# Patient Record
Sex: MALE | Race: BLACK OR AFRICAN AMERICAN | NOT HISPANIC OR LATINO | Employment: FULL TIME | URBAN - METROPOLITAN AREA
[De-identification: names, ages, dates, MRNs, and addresses within clinical notes are randomized per-mention and may not be internally consistent; named-entity substitution may affect disease eponyms.]

---

## 2017-06-19 ENCOUNTER — HOSPITAL ENCOUNTER (EMERGENCY)
Facility: HOSPITAL | Age: 27
Discharge: HOME | End: 2017-06-19
Attending: EMERGENCY MEDICINE

## 2017-06-19 VITALS
RESPIRATION RATE: 19 BRPM | DIASTOLIC BLOOD PRESSURE: 89 MMHG | HEIGHT: 71 IN | WEIGHT: 290 LBS | OXYGEN SATURATION: 98 % | BODY MASS INDEX: 40.6 KG/M2 | SYSTOLIC BLOOD PRESSURE: 160 MMHG | HEART RATE: 92 BPM | TEMPERATURE: 98 F

## 2017-06-19 DIAGNOSIS — J06.9 URI, ACUTE: Primary | ICD-10-CM

## 2017-06-19 DIAGNOSIS — J20.9 ACUTE BRONCHITIS, UNSPECIFIED ORGANISM: ICD-10-CM

## 2017-06-19 DIAGNOSIS — R03.0 ELEVATED BLOOD PRESSURE READING: ICD-10-CM

## 2017-06-19 PROCEDURE — 99283 EMERGENCY DEPT VISIT LOW MDM: CPT

## 2017-06-19 RX ORDER — ALBUTEROL SULFATE 90 UG/1
1-2 AEROSOL, METERED RESPIRATORY (INHALATION) EVERY 6 HOURS PRN
Qty: 1 INHALER | Refills: 0 | Status: SHIPPED | OUTPATIENT
Start: 2017-06-19 | End: 2019-03-09

## 2017-06-19 RX ORDER — BENZONATATE 100 MG/1
100 CAPSULE ORAL 3 TIMES DAILY PRN
Qty: 20 CAPSULE | Refills: 0 | Status: SHIPPED | OUTPATIENT
Start: 2017-06-19 | End: 2017-06-29

## 2017-06-19 RX ORDER — PREDNISONE 20 MG/1
40 TABLET ORAL DAILY
Qty: 10 TABLET | Refills: 0 | Status: SHIPPED | OUTPATIENT
Start: 2017-06-19 | End: 2017-06-24

## 2017-06-19 RX ORDER — CETIRIZINE HYDROCHLORIDE 10 MG/1
10 TABLET ORAL DAILY
Qty: 30 TABLET | Refills: 0 | Status: SHIPPED | OUTPATIENT
Start: 2017-06-19 | End: 2019-03-09

## 2017-06-19 NOTE — ED PROVIDER NOTES
Encounter Date: 6/19/2017       History     Chief Complaint   Patient presents with    URI     since friday      Review of patient's allergies indicates:  No Known Allergies  The history is provided by the patient.   URI   The primary symptoms include cough. Primary symptoms do not include fever, fatigue, headaches, ear pain, sore throat, wheezing, abdominal pain, nausea, vomiting, myalgias, arthralgias or rash. Illness onset: 4 days. This is a new problem. The problem has been gradually worsening.   The cough began yesterday. The cough is non-productive.   Symptoms associated with the illness include congestion and rhinorrhea. The illness is not associated with chills, plugged ear sensation, facial pain or sinus pressure.     Took OTC theraflu c min help.  No PCP so he came to ED for eval.  Past Medical History:   Diagnosis Date    Hypertension      History reviewed. No pertinent surgical history.  Family History   Problem Relation Age of Onset    Stroke Mother     No Known Problems Father      Social History   Substance Use Topics    Smoking status: Never Smoker    Smokeless tobacco: Not on file    Alcohol use No     Review of Systems   Constitutional: Negative for chills, fatigue and fever.   HENT: Positive for congestion and rhinorrhea. Negative for ear discharge, ear pain, facial swelling, sinus pressure and sore throat.    Respiratory: Positive for cough. Negative for chest tightness, shortness of breath and wheezing.    Gastrointestinal: Negative for abdominal pain, nausea and vomiting.   Musculoskeletal: Negative for arthralgias and myalgias.   Skin: Negative for rash.   Neurological: Negative for headaches.   All other systems reviewed and are negative.      Physical Exam     Initial Vitals [06/19/17 0955]   BP Pulse Resp Temp SpO2   (!) 162/107 105 20 98 °F (36.7 °C) 98 %     Vitals:    06/19/17 0955 06/19/17 1017   BP: (!) 162/107 (!) 160/89   Pulse: 105 92   Resp: 20 19   Temp: 98 °F (36.7 °C)   "  TempSrc: Oral    SpO2: 98% 98%   Weight: 131.5 kg (290 lb)    Height: 5' 11" (1.803 m)      Physical Exam    Nursing note and vitals reviewed.  Constitutional: He appears well-developed and well-nourished.   obese   HENT:   Head: Normocephalic and atraumatic.   Mouth/Throat: Oropharynx is clear and moist.   Eyes: EOM are normal. Pupils are equal, round, and reactive to light.   Neck: Normal range of motion. Neck supple.   Cardiovascular: Normal rate, regular rhythm, normal heart sounds and intact distal pulses.   No edema   Pulmonary/Chest: Breath sounds normal. No respiratory distress. He has no wheezes. He has no rhonchi.   Abdominal: Soft. Bowel sounds are normal. There is no rebound.   Musculoskeletal: Normal range of motion. He exhibits no edema.   Neurological: He is alert and oriented to person, place, and time. He has normal strength. No cranial nerve deficit or sensory deficit.   Skin: Skin is warm and dry. No rash noted.   Psychiatric: He has a normal mood and affect. His behavior is normal. Judgment and thought content normal.         ED Course   Procedures  Labs Reviewed - No data to display               No results found for this or any previous visit.               ED Course     Clinical Impression:   The primary encounter diagnosis was URI, acute. Diagnoses of Acute bronchitis, unspecified organism and Elevated blood pressure reading were also pertinent to this visit.    Disposition:   Disposition: Discharged  Condition: Stable       Matty Rajan MD  06/19/17 1523    "

## 2018-04-25 ENCOUNTER — CLINICAL SUPPORT (OUTPATIENT)
Dept: OCCUPATIONAL MEDICINE | Facility: CLINIC | Age: 28
End: 2018-04-25

## 2018-04-25 PROCEDURE — 99499 UNLISTED E&M SERVICE: CPT | Mod: S$GLB,,, | Performed by: PREVENTIVE MEDICINE

## 2018-04-25 PROCEDURE — 80305 DRUG TEST PRSMV DIR OPT OBS: CPT | Mod: S$GLB,,, | Performed by: PREVENTIVE MEDICINE

## 2018-04-25 PROCEDURE — 94010 BREATHING CAPACITY TEST: CPT | Mod: S$GLB,,, | Performed by: PREVENTIVE MEDICINE

## 2018-04-25 PROCEDURE — 99080 SPECIAL REPORTS OR FORMS: CPT | Mod: S$GLB,,, | Performed by: PREVENTIVE MEDICINE

## 2019-03-09 ENCOUNTER — HOSPITAL ENCOUNTER (EMERGENCY)
Facility: HOSPITAL | Age: 29
Discharge: HOME | End: 2019-03-09
Attending: EMERGENCY MEDICINE
Payer: COMMERCIAL

## 2019-03-09 VITALS
OXYGEN SATURATION: 100 % | TEMPERATURE: 99 F | HEIGHT: 70 IN | RESPIRATION RATE: 20 BRPM | BODY MASS INDEX: 45.1 KG/M2 | WEIGHT: 315 LBS | SYSTOLIC BLOOD PRESSURE: 134 MMHG | HEART RATE: 98 BPM | DIASTOLIC BLOOD PRESSURE: 84 MMHG

## 2019-03-09 DIAGNOSIS — B34.9 VIRAL ILLNESS: Primary | ICD-10-CM

## 2019-03-09 DIAGNOSIS — I10 ESSENTIAL HYPERTENSION: ICD-10-CM

## 2019-03-09 DIAGNOSIS — J00 ACUTE NASOPHARYNGITIS: ICD-10-CM

## 2019-03-09 PROBLEM — E66.01 MORBID OBESITY DUE TO EXCESS CALORIES: Status: ACTIVE | Noted: 2017-01-27

## 2019-03-09 LAB
INFLUENZA A, MOLECULAR: NEGATIVE
INFLUENZA B, MOLECULAR: NEGATIVE
SPECIMEN SOURCE: NORMAL

## 2019-03-09 PROCEDURE — 87502 INFLUENZA DNA AMP PROBE: CPT | Mod: ER

## 2019-03-09 PROCEDURE — 99284 EMERGENCY DEPT VISIT MOD MDM: CPT | Mod: ER

## 2019-03-09 PROCEDURE — 25000003 PHARM REV CODE 250: Mod: ER | Performed by: NURSE PRACTITIONER

## 2019-03-09 RX ORDER — LORATADINE 10 MG/1
10 TABLET ORAL DAILY
COMMUNITY
Start: 2019-03-09 | End: 2020-03-08

## 2019-03-09 RX ORDER — AMLODIPINE BESYLATE 10 MG/1
10 TABLET ORAL DAILY
Qty: 30 TABLET | Refills: 0 | Status: SHIPPED | OUTPATIENT
Start: 2019-03-09

## 2019-03-09 RX ORDER — ACETAMINOPHEN 500 MG
1000 TABLET ORAL
Status: COMPLETED | OUTPATIENT
Start: 2019-03-09 | End: 2019-03-09

## 2019-03-09 RX ORDER — CLONIDINE HYDROCHLORIDE 0.2 MG/1
0.2 TABLET ORAL
Status: COMPLETED | OUTPATIENT
Start: 2019-03-09 | End: 2019-03-09

## 2019-03-09 RX ORDER — IBUPROFEN 800 MG/1
800 TABLET ORAL EVERY 6 HOURS PRN
Qty: 30 TABLET | Refills: 0 | Status: SHIPPED | OUTPATIENT
Start: 2019-03-09

## 2019-03-09 RX ADMIN — CLONIDINE HYDROCHLORIDE 0.2 MG: 0.2 TABLET ORAL at 01:03

## 2019-03-09 RX ADMIN — ACETAMINOPHEN 1000 MG: 500 TABLET ORAL at 01:03

## 2019-03-09 NOTE — ED PROVIDER NOTES
"Encounter Date: 3/9/2019       History     Chief Complaint   Patient presents with    Nasal Congestion     since this am, no treatment PTA     The history is provided by the patient.   URI   The primary symptoms include fever, headaches, sore throat and myalgias. Primary symptoms do not include cough, abdominal pain, nausea, vomiting or rash. The current episode started today. This is a new problem. The problem has not changed since onset.The fever began today. The fever has been present for less than 1 day. The temperature was taken by no thermometer. The maximum temperature recorded prior to his arrival was unknown.   The headache began today. The pain from the headache is at a severity of 4/10. Location/region(s) of the headache: frontal. The headache is not associated with aura, photophobia, eye pain, visual change, neck stiffness, paresthesias or loss of balance.   The sore throat began today. The sore throat has been unchanged since its onset. The sore throat is not accompanied by trouble swallowing, drooling, hoarse voice or stridor. The sore throat pain is at a severity of 4/10.   Myalgias began today. The myalgias are generalized. The myalgias are aching. The myalgias are not associated with weakness.   The onset of the illness is associated with exposure to sick contacts (patient states that his children "may have had the flu" this past week). Symptoms associated with the illness include chills, sinus pressure, congestion and rhinorrhea. The following treatments were addressed: Acetaminophen was not tried. A decongestant was not tried. Aspirin was not tried. NSAIDs were not tried.         PCP:    Primary Doctor No        Review of patient's allergies indicates:  No Known Allergies  Past Medical History:   Diagnosis Date    Hypertension      History reviewed. No pertinent surgical history.  Family History   Problem Relation Age of Onset    Stroke Mother     No Known Problems Father      Social History "     Tobacco Use    Smoking status: Never Smoker    Smokeless tobacco: Never Used   Substance Use Topics    Alcohol use: No    Drug use: No     Review of Systems   Constitutional: Positive for chills and fever.   HENT: Positive for congestion, postnasal drip, rhinorrhea, sinus pressure, sneezing and sore throat. Negative for drooling, ear discharge, hoarse voice and trouble swallowing.    Eyes: Negative for photophobia and pain.   Respiratory: Negative for cough, chest tightness, shortness of breath and stridor.    Cardiovascular: Negative for chest pain and palpitations.   Gastrointestinal: Negative for abdominal pain, diarrhea, nausea and vomiting.   Genitourinary: Negative for difficulty urinating and dysuria.   Musculoskeletal: Positive for myalgias. Negative for back pain and neck stiffness.   Skin: Negative for color change and rash.   Neurological: Positive for headaches. Negative for dizziness, weakness, light-headedness, paresthesias and loss of balance.   Hematological: Does not bruise/bleed easily.   Psychiatric/Behavioral: Negative for confusion.       Physical Exam     Initial Vitals [03/09/19 1307]   BP Pulse Resp Temp SpO2   (!) 187/115 109 18 99.3 °F (37.4 °C) 100 %      MAP       --         Physical Exam    Nursing note and vitals reviewed.  Constitutional: He appears well-developed and well-nourished. He is Obese . He is cooperative. He does not appear ill. No distress.   HENT:   Head: Normocephalic and atraumatic.   Right Ear: Hearing, tympanic membrane, external ear and ear canal normal.   Left Ear: Hearing, tympanic membrane, external ear and ear canal normal.   Nose: Mucosal edema and sinus tenderness present.   Mouth/Throat: Uvula is midline and mucous membranes are normal. Posterior oropharyngeal erythema present.   Eyes: Conjunctivae, EOM and lids are normal. Pupils are equal, round, and reactive to light.   Neck: Trachea normal and normal range of motion. Neck supple.   Cardiovascular:  "Normal rate, regular rhythm, intact distal pulses and normal pulses.   Pulmonary/Chest: Effort normal and breath sounds normal. No accessory muscle usage. No respiratory distress. He has no decreased breath sounds. He has no wheezes. He has no rhonchi. He has no rales.   Abdominal: Soft. He exhibits no distension and no mass. There is no tenderness. There is no rebound and no guarding.   Musculoskeletal: Normal range of motion. He exhibits no edema.   Lymphadenopathy:     He has no cervical adenopathy.   Neurological: He is alert and oriented to person, place, and time. He has normal strength. No sensory deficit. Gait normal. GCS eye subscore is 4. GCS verbal subscore is 5. GCS motor subscore is 6.   Neurovascular intact to all extremities.    Skin: Skin is warm, dry and intact. Capillary refill takes less than 2 seconds. No rash noted.   Normal color and turgor.    Psychiatric: He has a normal mood and affect. His speech is normal and behavior is normal. Cognition and memory are normal.         ED Course   Procedures    ED Lab Results:   Results for orders placed or performed during the hospital encounter of 03/09/19   Influenza A & B by Molecular   Result Value Ref Range    Influenza A, Molecular Negative Negative    Influenza B, Molecular Negative Negative    Flu A & B Source NP            ED Medications:   Medications   cloNIDine tablet 0.2 mg (0.2 mg Oral Given 3/9/19 1333)   acetaminophen tablet 1,000 mg (1,000 mg Oral Given 3/9/19 1333)         ED Course Vitals  Vitals:    03/09/19 1307 03/09/19 1405 03/09/19 1435   BP: (!) 187/115 (!) 172/110 134/84   BP Location: Left arm Right arm Right arm   Patient Position: Sitting Sitting    Pulse: 109 100 98   Resp: 18 20 20   Temp: 99.3 °F (37.4 °C) 99.1 °F (37.3 °C)    TempSrc: Oral Oral    SpO2: 100% 100% 100%   Weight: (!) 146.9 kg (323 lb 13.7 oz)     Height: 5' 10" (1.778 m)           1435 HOURS RE-EVALUATION & DISPOSITION:   Reassessment at the time of " disposition demonstrates that the patient is resting comfortably in no acute distress.  He has remained hemodynamically stable throughout the entire ED visit and is without objective evidence for acute process requiring urgent intervention or hospitalization. I discussed test results and provided counseling to patient with regard to condition, the treatment plan, specific conditions for return, and the importance of follow up.  Answered questions at this time. The patient is stable for discharge.                  Additional MDM:   Hypertension: The patient has hypertensive urgency (systolic BP > 180 and/or diastolic BP > 110 with no end organ damage). Medications given: Catapres PO. Response: The patient's BP was controlled using oral medications. The patient's condition was felt to be stable.                    Clinical Impression:       ICD-10-CM ICD-9-CM   1. Viral illness B34.9 079.99   2. Essential hypertension I10 401.9   3. Acute nasopharyngitis J00 460           Disposition:   Disposition: Discharged  Condition: Stable  I discussed with patient that the evaluation in the emergency department does not suggest any emergent or life threatening medical condition requiring immediate intervention beyond what was provided in the ED, and I believe patient is safe for discharge.  Regardless, an unremarkable evaluation in the ED does not preclude the development or presence of a serious of life threatening condition. As such, patient was instructed to return immediately for any worsening or change in current symptoms. I also discussed the results of my evaluation and diagnostics with patient and he concurs with the evaluation and management plan.  Detailed written and verbal instructions provided to patient and he expressed a verbal understanding of the discharge instructions and overall management plan. Reiterated the importance of medication administration and safety and advised patient to follow up with primary  care provider in 3-5 days or sooner if needed.  Also advised patient to return to the ER for any complications.     Regarding HYPERTENSION, I advised patient to: keep a record of blood pressure results; take your blood pressure medication exactly as directed without skipping doses; avoid medications that contain heart stimulants, including over-the-counter drugs such as decongestants; maintain a healthy weight; cut back on sodium intake (i.e., limit canned, dried, packaged, and fast foods and dont add salt to food); follow the DASH (Dietary Approaches to Stop Hypertension) eating plan which recommends vegetables, fruits, whole gains, and other heart healthy foods; begin an exercise program that includes  aerobic exercise 3 to 4 times a week for an average of 40 minutes at a time (with approval of cardiologist or primary care provider); limit drinks that contain alcohol and caffeine; control levels of emotional stress; and seek emergency care for any shortness of breath, chest pain, difficulty speaking, confusion, or visual changes.      Regarding UPPER RESPIRATORY ILLNESS, for treatment, I encouraged patient to: drink plenty of fluids; get lots of rest; take medications as prescribed; use over-the-counter medications for symptomatic treatment;  and use a humidifier or steam in the bathroom to improve patency of upper airway.  Patient instructed to notify primary care provider, or return to the emergency department, if the they: have a cough most days or have a cough that returns frequently; begin coughing up blood; develop a high fever or shaking chills; have a low-grade fever for three or more days; develop thick, greenish mucus, especially if it has a bad smell; and experience shortness of breath or chest pain. For prevention, discussed with patient the importance of refraining from smoking (if applicable), getting annual influenza vaccines, reducing exposure to air pollution, and the need to frequently wash  hands to avoid spread of infection.            Follow-up Information     Schedule an appointment as soon as possible for a visit  with Primary Care Provider.    Why:  For further evaluation and treatment of your elevated blood pressure                   New Prescriptions    AMLODIPINE (NORVASC) 10 MG TABLET    Take 1 tablet (10 mg total) by mouth once daily.    IBUPROFEN (ADVIL,MOTRIN) 800 MG TABLET    Take 1 tablet (800 mg total) by mouth every 6 (six) hours as needed (Fever and/or Pain).    LORATADINE (CLARITIN) 10 MG TABLET    Take 1 tablet (10 mg total) by mouth once daily.                    Adama Yu, OMKAR  03/09/19 4745

## 2019-03-09 NOTE — DISCHARGE INSTRUCTIONS
The test for influenza was negative.     Do your history of hypertension, I recommend avoiding the use of decongestants.  Decongestants such as pseudoephedrine or phenylephrine cause your blood pressure to increase and should be avoided.    Follow up with your primary care provider AS SOON AS POSSIBLE for re-evaluation and further treatment of your blood pressure.    Your medications have been sent electronically to [facility] in [place]. Take your medications as prescribed.

## 2024-11-16 ENCOUNTER — HOSPITAL ENCOUNTER (EMERGENCY)
Facility: HOSPITAL | Age: 34
Discharge: HOME | End: 2024-11-16
Attending: EMERGENCY MEDICINE
Payer: COMMERCIAL

## 2024-11-16 VITALS
BODY MASS INDEX: 44.1 KG/M2 | TEMPERATURE: 99 F | HEIGHT: 70 IN | OXYGEN SATURATION: 95 % | HEART RATE: 96 BPM | RESPIRATION RATE: 18 BRPM | SYSTOLIC BLOOD PRESSURE: 151 MMHG | DIASTOLIC BLOOD PRESSURE: 83 MMHG | WEIGHT: 308.06 LBS

## 2024-11-16 DIAGNOSIS — F41.9 ANXIETY: ICD-10-CM

## 2024-11-16 DIAGNOSIS — R07.9 CHEST PAIN: Primary | ICD-10-CM

## 2024-11-16 DIAGNOSIS — I10 PRIMARY HYPERTENSION: ICD-10-CM

## 2024-11-16 LAB
ALBUMIN SERPL BCP-MCNC: 3.5 G/DL (ref 3.5–5.2)
ALP SERPL-CCNC: 48 U/L (ref 40–150)
ALT SERPL W/O P-5'-P-CCNC: 35 U/L (ref 10–44)
ANION GAP SERPL CALC-SCNC: 11 MMOL/L (ref 8–16)
AST SERPL-CCNC: 26 U/L (ref 10–40)
BASOPHILS # BLD AUTO: 0 K/UL (ref 0–0.2)
BASOPHILS NFR BLD: 0 % (ref 0–1.9)
BILIRUB SERPL-MCNC: 0.5 MG/DL (ref 0.1–1)
BNP SERPL-MCNC: <10 PG/ML (ref 0–99)
BUN SERPL-MCNC: 18 MG/DL (ref 6–20)
CALCIUM SERPL-MCNC: 8.6 MG/DL (ref 8.7–10.5)
CHLORIDE SERPL-SCNC: 105 MMOL/L (ref 95–110)
CO2 SERPL-SCNC: 21 MMOL/L (ref 23–29)
CREAT SERPL-MCNC: 1.3 MG/DL (ref 0.5–1.4)
D DIMER PPP IA.FEU-MCNC: 0.49 MG/L FEU
DIFFERENTIAL METHOD BLD: ABNORMAL
EOSINOPHIL # BLD AUTO: 0.1 K/UL (ref 0–0.5)
EOSINOPHIL NFR BLD: 1.4 % (ref 0–8)
ERYTHROCYTE [DISTWIDTH] IN BLOOD BY AUTOMATED COUNT: 12.8 % (ref 11.5–14.5)
EST. GFR  (NO RACE VARIABLE): >60 ML/MIN/1.73 M^2
GLUCOSE SERPL-MCNC: 133 MG/DL (ref 70–110)
HCT VFR BLD AUTO: 38.3 % (ref 40–54)
HGB BLD-MCNC: 12.4 G/DL (ref 14–18)
IMM GRANULOCYTES # BLD AUTO: 0.01 K/UL (ref 0–0.04)
IMM GRANULOCYTES NFR BLD AUTO: 0.2 % (ref 0–0.5)
LYMPHOCYTES # BLD AUTO: 1.3 K/UL (ref 1–4.8)
LYMPHOCYTES NFR BLD: 23 % (ref 18–48)
MAGNESIUM SERPL-MCNC: 1.8 MG/DL (ref 1.6–2.6)
MCH RBC QN AUTO: 28.2 PG (ref 27–31)
MCHC RBC AUTO-ENTMCNC: 32.4 G/DL (ref 32–36)
MCV RBC AUTO: 87 FL (ref 82–98)
MONOCYTES # BLD AUTO: 0.4 K/UL (ref 0.3–1)
MONOCYTES NFR BLD: 7.2 % (ref 4–15)
NEUTROPHILS # BLD AUTO: 3.9 K/UL (ref 1.8–7.7)
NEUTROPHILS NFR BLD: 68.2 % (ref 38–73)
NRBC BLD-RTO: 0 /100 WBC
PLATELET # BLD AUTO: 258 K/UL (ref 150–450)
PMV BLD AUTO: 10.4 FL (ref 9.2–12.9)
POTASSIUM SERPL-SCNC: 3.9 MMOL/L (ref 3.5–5.1)
PROT SERPL-MCNC: 7.9 G/DL (ref 6–8.4)
RBC # BLD AUTO: 4.4 M/UL (ref 4.6–6.2)
SODIUM SERPL-SCNC: 137 MMOL/L (ref 136–145)
TROPONIN I SERPL DL<=0.01 NG/ML-MCNC: 0.01 NG/ML (ref 0–0.03)
TROPONIN I SERPL DL<=0.01 NG/ML-MCNC: 0.01 NG/ML (ref 0–0.03)
WBC # BLD AUTO: 5.69 K/UL (ref 3.9–12.7)

## 2024-11-16 PROCEDURE — 96361 HYDRATE IV INFUSION ADD-ON: CPT | Mod: ER

## 2024-11-16 PROCEDURE — 84484 ASSAY OF TROPONIN QUANT: CPT | Mod: 91,ER | Performed by: EMERGENCY MEDICINE

## 2024-11-16 PROCEDURE — 96374 THER/PROPH/DIAG INJ IV PUSH: CPT | Mod: ER

## 2024-11-16 PROCEDURE — 94761 N-INVAS EAR/PLS OXIMETRY MLT: CPT | Mod: ER

## 2024-11-16 PROCEDURE — 93005 ELECTROCARDIOGRAM TRACING: CPT | Mod: ER

## 2024-11-16 PROCEDURE — 83880 ASSAY OF NATRIURETIC PEPTIDE: CPT | Mod: ER | Performed by: EMERGENCY MEDICINE

## 2024-11-16 PROCEDURE — 63600175 PHARM REV CODE 636 W HCPCS: Mod: ER | Performed by: EMERGENCY MEDICINE

## 2024-11-16 PROCEDURE — 80053 COMPREHEN METABOLIC PANEL: CPT | Mod: ER | Performed by: EMERGENCY MEDICINE

## 2024-11-16 PROCEDURE — 25000003 PHARM REV CODE 250: Mod: ER | Performed by: EMERGENCY MEDICINE

## 2024-11-16 PROCEDURE — 96375 TX/PRO/DX INJ NEW DRUG ADDON: CPT | Mod: ER

## 2024-11-16 PROCEDURE — 85379 FIBRIN DEGRADATION QUANT: CPT | Mod: ER | Performed by: EMERGENCY MEDICINE

## 2024-11-16 PROCEDURE — 99285 EMERGENCY DEPT VISIT HI MDM: CPT | Mod: 25,ER

## 2024-11-16 PROCEDURE — 83735 ASSAY OF MAGNESIUM: CPT | Mod: ER | Performed by: EMERGENCY MEDICINE

## 2024-11-16 PROCEDURE — 85025 COMPLETE CBC W/AUTO DIFF WBC: CPT | Mod: ER | Performed by: EMERGENCY MEDICINE

## 2024-11-16 PROCEDURE — 93010 ELECTROCARDIOGRAM REPORT: CPT | Mod: ,,, | Performed by: INTERNAL MEDICINE

## 2024-11-16 RX ORDER — LORAZEPAM 1 MG/1
1 TABLET ORAL NIGHTLY PRN
Qty: 10 TABLET | Refills: 0 | Status: SHIPPED | OUTPATIENT
Start: 2024-11-16

## 2024-11-16 RX ORDER — AMLODIPINE BESYLATE 10 MG/1
10 TABLET ORAL DAILY
Qty: 30 TABLET | Refills: 0 | Status: SHIPPED | OUTPATIENT
Start: 2024-11-16 | End: 2024-12-16

## 2024-11-16 RX ORDER — HYDROXYZINE HYDROCHLORIDE 50 MG/1
50 TABLET, FILM COATED ORAL EVERY 4 HOURS PRN
Qty: 30 TABLET | Refills: 0 | Status: SHIPPED | OUTPATIENT
Start: 2024-11-16

## 2024-11-16 RX ORDER — LORAZEPAM 2 MG/ML
1 INJECTION INTRAMUSCULAR
Status: COMPLETED | OUTPATIENT
Start: 2024-11-16 | End: 2024-11-16

## 2024-11-16 RX ORDER — LABETALOL HYDROCHLORIDE 5 MG/ML
10 INJECTION, SOLUTION INTRAVENOUS
Status: COMPLETED | OUTPATIENT
Start: 2024-11-16 | End: 2024-11-16

## 2024-11-16 RX ORDER — AMLODIPINE BESYLATE 5 MG/1
10 TABLET ORAL
Status: COMPLETED | OUTPATIENT
Start: 2024-11-16 | End: 2024-11-16

## 2024-11-16 RX ADMIN — SODIUM CHLORIDE 1000 ML: 9 INJECTION, SOLUTION INTRAVENOUS at 06:11

## 2024-11-16 RX ADMIN — LORAZEPAM 1 MG: 2 INJECTION INTRAMUSCULAR; INTRAVENOUS at 07:11

## 2024-11-16 RX ADMIN — LABETALOL HYDROCHLORIDE 10 MG: 5 INJECTION INTRAVENOUS at 06:11

## 2024-11-16 RX ADMIN — AMLODIPINE BESYLATE 10 MG: 5 TABLET ORAL at 07:11

## 2024-11-16 NOTE — ED PROVIDER NOTES
"Encounter Date: 11/16/2024       History     Chief Complaint   Patient presents with    Chest Pain     Began around mid day today while "sitting around". "Light" pain, tight. Has improved since onset. Denies SOB. Vomited 30 min pta. Out of bp meds x 1 month.      Patient is a 34-year-old male presenting to emergency department for chest pain.  Patient has a history of elevated BMI as well as essential hypertension.  Patient has been noncompliant with his blood pressure medication secondary to lack of insurance.  Patient has been prescribed amlodipine 10 mg.  Patient reports that he started feeling bad yesterday with multiple episodes of watery diarrhea.  Patient states today around 1:00 p.m. he started having central chest pressure.  Essentially constant.  It did not radiate to the arm neck or jaw.  He did feel nauseous with it.  Patient did have 1 bout of emesis.  Patient denies any known history of coronary artery disease, hyperlipidemia, or tobacco abuse.  Patient does have high blood pressure as well as borderline diabetes.  Patient denies any calf pain, calf tenderness, prior DVT, or prior PE.  Patient was noted to have elevated blood pressures on triage.  Patient denies any headache, numbness or weakness to 1 side, slurred speech, difficulty talking        Review of patient's allergies indicates:  No Known Allergies  Past Medical History:   Diagnosis Date    Hypertension      History reviewed. No pertinent surgical history.  Family History   Problem Relation Name Age of Onset    Stroke Mother      No Known Problems Father       Social History     Tobacco Use    Smoking status: Never    Smokeless tobacco: Never   Substance Use Topics    Alcohol use: No    Drug use: No     Review of Systems   Constitutional:  Negative for chills and fever.   Respiratory:  Negative for cough.    Cardiovascular:  Positive for chest pain (Central.  No radiation to the arm neck or jaw.). Negative for palpitations and leg swelling. "   Gastrointestinal:  Positive for diarrhea (Multiple episodes yesterday) and vomiting (x 1). Negative for abdominal pain, blood in stool and nausea.   Musculoskeletal:  Negative for back pain and neck pain.   Neurological:  Negative for dizziness, weakness, light-headedness, numbness and headaches.       Physical Exam     Initial Vitals   BP Pulse Resp Temp SpO2   11/16/24 1746 11/16/24 1743 11/16/24 1743 11/16/24 1751 11/16/24 1743   (!) 214/139 (!) 115 16 98.6 °F (37 °C) 98 %      MAP       --                Physical Exam    Nursing Notes and Vital Signs Reviewed.  Constitutional:  Well developed, well nourished.  He is awake & alert.  He is in no apparent distress  Head:  Atraumatic.  Normocephalic.    Eyes:  PERRL.  EOMI.  Conjunctivae are not pale. No scleral icterus.  ENT:  Mucous membranes are dry..  Oropharynx is clear and symmetric.    Neck:  Supple. Full ROM.  No lymphadenopathy.  Cardiovascular:  Regular rate.  Regular rhythm.  Tachycardic.  S1 and S2 heart sounds present.  No murmurs, rubs, or gallops.  Distal pulses are 2+ and symmetric.  Pulmonary/Chest:  No evidence of respiratory distress.  Clear to auscultation bilaterally.  No wheezing, rales or rhonchi.  Abdominal:  Soft and non-distended.  There is no tenderness.  No rebound, guarding, or rigidity.  Good bowel sounds.  No organomegaly.    Genitourinary: No CVA tenderness.  Musculoskeletal:  Moves all four extremities. No obvious deformities.  No edema. No calf tenderness.    Skin:  Skin is warm and dry. No rashes.      Neurological:  Alert, awake, and appropriate.  Normal speech.  No acute focal neurological deficits are appreciated.  Cranial nerves 2-12 intact.  Finger-to-nose intact.  Negative pronator drift.  No calf pain, calf tenderness.  Psychiatric:  Good eye contact.  Appropriate in content/context. Normal affect.    ED Course   Procedures  Labs Reviewed   CBC W/ AUTO DIFFERENTIAL - Abnormal       Result Value    WBC 5.69      RBC 4.40  (*)     Hemoglobin 12.4 (*)     Hematocrit 38.3 (*)     MCV 87      MCH 28.2      MCHC 32.4      RDW 12.8      Platelets 258      MPV 10.4      Immature Granulocytes 0.2      Gran # (ANC) 3.9      Immature Grans (Abs) 0.01      Lymph # 1.3      Mono # 0.4      Eos # 0.1      Baso # 0.00      nRBC 0      Gran % 68.2      Lymph % 23.0      Mono % 7.2      Eosinophil % 1.4      Basophil % 0.0      Differential Method Automated     COMPREHENSIVE METABOLIC PANEL - Abnormal    Sodium 137      Potassium 3.9      Chloride 105      CO2 21 (*)     Glucose 133 (*)     BUN 18      Creatinine 1.3      Calcium 8.6 (*)     Total Protein 7.9      Albumin 3.5      Total Bilirubin 0.5      Alkaline Phosphatase 48      AST 26      ALT 35      eGFR >60.0      Anion Gap 11     TROPONIN I    Troponin I 0.013     TROPONIN I    Troponin I 0.015     B-TYPE NATRIURETIC PEPTIDE    BNP <10     MAGNESIUM    Magnesium 1.8     D DIMER, QUANTITATIVE    D-Dimer 0.49       Results for orders placed or performed during the hospital encounter of 11/16/24   CBC auto differential    Collection Time: 11/16/24  6:16 PM   Result Value Ref Range    WBC 5.69 3.90 - 12.70 K/uL    RBC 4.40 (L) 4.60 - 6.20 M/uL    Hemoglobin 12.4 (L) 14.0 - 18.0 g/dL    Hematocrit 38.3 (L) 40.0 - 54.0 %    MCV 87 82 - 98 fL    MCH 28.2 27.0 - 31.0 pg    MCHC 32.4 32.0 - 36.0 g/dL    RDW 12.8 11.5 - 14.5 %    Platelets 258 150 - 450 K/uL    MPV 10.4 9.2 - 12.9 fL    Immature Granulocytes 0.2 0.0 - 0.5 %    Gran # (ANC) 3.9 1.8 - 7.7 K/uL    Immature Grans (Abs) 0.01 0.00 - 0.04 K/uL    Lymph # 1.3 1.0 - 4.8 K/uL    Mono # 0.4 0.3 - 1.0 K/uL    Eos # 0.1 0.0 - 0.5 K/uL    Baso # 0.00 0.00 - 0.20 K/uL    nRBC 0 0 /100 WBC    Gran % 68.2 38.0 - 73.0 %    Lymph % 23.0 18.0 - 48.0 %    Mono % 7.2 4.0 - 15.0 %    Eosinophil % 1.4 0.0 - 8.0 %    Basophil % 0.0 0.0 - 1.9 %    Differential Method Automated    Comprehensive metabolic panel    Collection Time: 11/16/24  6:16 PM   Result  Value Ref Range    Sodium 137 136 - 145 mmol/L    Potassium 3.9 3.5 - 5.1 mmol/L    Chloride 105 95 - 110 mmol/L    CO2 21 (L) 23 - 29 mmol/L    Glucose 133 (H) 70 - 110 mg/dL    BUN 18 6 - 20 mg/dL    Creatinine 1.3 0.5 - 1.4 mg/dL    Calcium 8.6 (L) 8.7 - 10.5 mg/dL    Total Protein 7.9 6.0 - 8.4 g/dL    Albumin 3.5 3.5 - 5.2 g/dL    Total Bilirubin 0.5 0.1 - 1.0 mg/dL    Alkaline Phosphatase 48 40 - 150 U/L    AST 26 10 - 40 U/L    ALT 35 10 - 44 U/L    eGFR >60.0 >60 mL/min/1.73 m^2    Anion Gap 11 8 - 16 mmol/L   Troponin I #1    Collection Time: 11/16/24  6:16 PM   Result Value Ref Range    Troponin I 0.013 0.000 - 0.026 ng/mL   BNP    Collection Time: 11/16/24  6:16 PM   Result Value Ref Range    BNP <10 0 - 99 pg/mL   Magnesium    Collection Time: 11/16/24  6:16 PM   Result Value Ref Range    Magnesium 1.8 1.6 - 2.6 mg/dL   D dimer, quantitative    Collection Time: 11/16/24  6:16 PM   Result Value Ref Range    D-Dimer 0.49 <0.50 mg/L [facility]   Troponin I #2    Collection Time: 11/16/24  8:43 PM   Result Value Ref Range    Troponin I 0.015 0.000 - 0.026 ng/mL          ECG Results              EKG 12-lead (Preliminary result)  Result time 11/16/24 17:54:09      Wet Read by Franca Alvarado DO (11/16/24 17:54:09, [facility] - Emergency Dept, Emergency Medicine)    EKG: Rate 112 beats per minute.  Sinus tachycardia.  Normal axis.  Nonspecific changes.  No ST segment elevation.  No STEMI.                                  Imaging Results              X-Ray Chest AP Portable (Final result)  Result time 11/16/24 18:31:49      Final result by Zeb Martines MD (11/16/24 18:31:49)                   Impression:      No acute abnormality.      Electronically signed by: Zeb Martines  Date:    11/16/2024  Time:    18:31               Narrative:    EXAMINATION:  XR CHEST AP PORTABLE    CLINICAL HISTORY:  Chest Pain;    TECHNIQUE:  Single frontal view of the chest was performed.    COMPARISON:  None    FINDINGS:  The lungs  are clear, with normal appearance of pulmonary vasculature and no pleural effusion or pneumothorax.    The cardiac silhouette is normal in size. The hilar and mediastinal contours are unremarkable.    Bones are intact.                                       Medications   sodium chloride 0.9% bolus 1,000 mL 1,000 mL (0 mLs Intravenous Stopped 11/16/24 1917)   labetaloL injection 10 mg (10 mg Intravenous Given 11/16/24 1817)   LORazepam injection 1 mg (1 mg Intravenous Given 11/16/24 1926)   amLODIPine tablet 10 mg (10 mg Oral Given 11/16/24 1925)     18:00 Care to Dr. Maya Holt      Medical Decision Making    34-year-old male who presents with chest pain.  Admits to anxiety and blood pressure is elevated.  He was tachycardic.  D-dimer negative for PE.  EKG shows sinus tach otherwise normal and troponin x2 negative.  BNP and chest x-ray are normal.  Relatively normal CBC and CMP.  Chest pain resolved after appropriate blood pressure control with labetalol and amlodipine.  Tachycardia resolved with Ativan and fluids.  Ambulatory referral provided to Cardiology.  Discharged with a refill for his amlodipine as well as a short course of Ativan to take at night and hydralazine as needed for anxiety.  Additional MDM:   Differential Diagnosis:   Anxiety, hypertension, costochondritis, GERD, ACS, PE                                    Clinical Impression:  Final diagnoses:  [R07.9] Chest pain (Primary)  [F41.9] Anxiety  [I10] Primary hypertension          ED Disposition Condition    Discharge Stable          ED Prescriptions       Medication Sig Dispense Start Date End Date Auth. Provider    amLODIPine (NORVASC) 10 MG tablet Take 1 tablet (10 mg total) by mouth once daily. 30 tablet 11/16/2024 12/16/2024 Maya Holt,     LORazepam (ATIVAN) 1 MG tablet Take 1 tablet (1 mg total) by mouth nightly as needed for Anxiety. 10 tablet 11/16/2024 -- Maya Holt,     hydrOXYzine (ATARAX) 50 MG tablet Take 1 tablet (50 mg  total) by mouth every 4 (four) hours as needed for Anxiety. 30 tablet 11/16/2024 -- Maya Holt,           Follow-up Information       Follow up With Specialties Details Why Contact Info    Malachi Villatoro MD Internal Medicine On 11/18/2024  [address]  [facility] [id number]  [phone]      [facility] - Emergency Dept Emergency Medicine  As needed, If symptoms worsen [id number]  Emergency Department  [facility] [id number] [phone]             Maya Holt,   11/16/24 1614

## 2024-11-17 LAB
OHS QRS DURATION: 76 MS
OHS QTC CALCULATION: 436 MS